# Patient Record
Sex: FEMALE | URBAN - METROPOLITAN AREA
[De-identification: names, ages, dates, MRNs, and addresses within clinical notes are randomized per-mention and may not be internally consistent; named-entity substitution may affect disease eponyms.]

---

## 2017-02-13 ENCOUNTER — IMPORTED ENCOUNTER (OUTPATIENT)
Dept: URBAN - METROPOLITAN AREA CLINIC 1 | Facility: CLINIC | Age: 57
End: 2017-02-13

## 2017-02-13 PROBLEM — Z79.899: Noted: 2017-02-13

## 2017-02-13 PROBLEM — H25.813: Noted: 2017-02-13

## 2017-02-13 PROBLEM — H04.123: Noted: 2017-02-13

## 2017-02-13 PROCEDURE — 92004 COMPRE OPH EXAM NEW PT 1/>: CPT

## 2017-02-13 NOTE — PATIENT DISCUSSION
1.  High Risk Medication-( rheumatoid arthritis) Hx of Plaquenil without evidence of Toxicity or Plaquenil retinopathy. 2.  Cataract OU: Observe for now without intervention. The patient was advised to contact us if any change or worsening of vision3. Dry Eyes OU -- Recommended to patient to use Artificial Tears BID OU. Gave patient a sample of Systane Balance4. Patient defers the refraction at today's visit    5. Return for an appointment in 1 year for 30. with Dr. Leonie Greco.

## 2017-03-15 NOTE — PATIENT DISCUSSION
OCULAR ALLERGIES OU: RX OVER THE COUNTER ALLERGIES DROP, SUCH AS ZADITOR TWICE A DAY IN BOTH EYES AS NEEDED.

## 2017-03-15 NOTE — PATIENT DISCUSSION
ANNALISE OU:  PRESCRIBED UV PROTECTION TO SLOW GROWTH. PRESCRIBE ARTIFICAL TEARS TO INCREASE COMFORT.

## 2018-01-31 ENCOUNTER — IMPORTED ENCOUNTER (OUTPATIENT)
Dept: URBAN - METROPOLITAN AREA CLINIC 1 | Facility: CLINIC | Age: 58
End: 2018-01-31

## 2018-01-31 PROBLEM — D48.5: Noted: 2018-01-31

## 2018-01-31 PROBLEM — INACTIVE: Noted: 2018-01-31

## 2018-01-31 PROBLEM — H25.813: Noted: 2018-01-31

## 2018-01-31 PROBLEM — H35.412: Noted: 2018-01-31

## 2018-01-31 PROBLEM — H04.123: Noted: 2018-01-31

## 2018-01-31 PROCEDURE — 92014 COMPRE OPH EXAM EST PT 1/>: CPT

## 2018-01-31 NOTE — PATIENT DISCUSSION
1.  RLL Mass of uncertain behavior- Plan excisional biopsy with pathology. Patient has noticed has grown in size within past three months. Patient notes bleeding when washing face sometimes has experienced irritation. 2.  Dry Eyes OU -- Recommended to patient to use Artificial Tears BID OU3. Cataract OU: Observe for now without intervention. The patient was advised to contact us if any change or worsening of vision4. 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 Patient was cautioned to call our office immediately if they experience a substantial change in their symptoms such as an increase in floaters persistent flashes loss of visual field (may appear as a shadow or a curtain) or decrease in visual acuity as these may indicate a retinal tear or detachment. Return for an appointment in excision and biopsy RLL with Dr. Sariah Dhillon. Return for an appointment in 1 year 27 with Dr. Catrachito Sandoval.

## 2018-02-14 ENCOUNTER — IMPORTED ENCOUNTER (OUTPATIENT)
Dept: URBAN - METROPOLITAN AREA CLINIC 1 | Facility: CLINIC | Age: 58
End: 2018-02-14

## 2018-02-14 PROCEDURE — 67840 REMOVE EYELID LESION: CPT

## 2018-02-14 NOTE — PATIENT DISCUSSION
Excision of lesion on right lower eyelid: After proper informed consent was obtained the patient was taken to the operating room and placed supine on the operating table. The right eye was then prepped in the standard surgical fashion. Attention was then turned to the right lower eyelid where one percent Xylocaine with Epinephrine was infiltrated under the lesion. A scalpel was then to make an elliptical incision around the lesion and Cristofer scissors were used to excise the mass free. Cautery was used for hemostasis and ointment was applied. The wound was small and therefore left to heal by secondary intention. The patient tolerated the procedure well and there were no complications.  Use Maxitrol marilyn BID to RLL x10 days then d/c. RTC 1 yr 30

## 2018-02-21 PROBLEM — D23.11: Noted: 2018-02-21

## 2019-11-20 NOTE — PATIENT DISCUSSION
Continue: Bepreve (bepotastine besilate): drops: 1.5% 1 drop twice a day as directed into both eyes 03-

## 2019-11-20 NOTE — PATIENT DISCUSSION
OCULAR ALLERGIES OU: RX BEPREVE ONE DROP TWICE A DAY AS NEED OR OVER THE COUNTER ALLERGIES DROP, SUCH AS ZADITOR TWICE A DAY IN BOTH EYES AS NEEDED.

## 2021-02-16 NOTE — PATIENT DISCUSSION
TRICHIASIS, OS LL: SOME LASHES INVERTED ON LOWER LID,  IF BECOMES MORE BOTHERSOME WILL CONSIDER REFERRAL FOR SURGICAL TREATMENT TO DR Amy Beckham

## 2022-02-15 NOTE — PROCEDURE NOTE: CLINICAL
PROCEDURE NOTE: Epilation Left Lower Lid. Diagnosis: Trichiasis without Entropion. Anesthesia: Topical. Prior to treatment, the risks/benefits/alternatives were discussed. The patient wished to proceed with procedure. Aberrant lashes removed from * lid(s) using microforcep. Patient tolerated procedure well. There were no complications. Post-op instructions given. Shruthi Disla

## 2022-04-02 ASSESSMENT — VISUAL ACUITY
OS_CC: 20/20
OS_CC: 20/20
OD_CC: 20/20
OD_CC: 20/20-1

## 2022-04-02 ASSESSMENT — TONOMETRY
OD_IOP_MMHG: 16
OD_IOP_MMHG: 17
OS_IOP_MMHG: 16
OS_IOP_MMHG: 17

## 2022-07-22 ENCOUNTER — NEW PATIENT (OUTPATIENT)
Dept: URBAN - METROPOLITAN AREA CLINIC 1 | Facility: CLINIC | Age: 62
End: 2022-07-22

## 2022-07-22 DIAGNOSIS — H04.123: ICD-10-CM

## 2022-07-22 DIAGNOSIS — H35.412: ICD-10-CM

## 2022-07-22 DIAGNOSIS — H25.813: ICD-10-CM

## 2022-07-22 PROCEDURE — 99203 OFFICE O/P NEW LOW 30 MIN: CPT

## 2022-07-22 PROCEDURE — 92015 DETERMINE REFRACTIVE STATE: CPT

## 2022-07-22 ASSESSMENT — KERATOMETRY
OS_AXISANGLE2_DEGREES: 175
OS_K1POWER_DIOPTERS: 44.25
OD_K1POWER_DIOPTERS: 43.75
OS_AXISANGLE_DEGREES: 085
OS_K2POWER_DIOPTERS: 44.50
OD_AXISANGLE_DEGREES: 170
OD_AXISANGLE2_DEGREES: 80
OD_K2POWER_DIOPTERS: 44.25

## 2022-07-22 ASSESSMENT — VISUAL ACUITY
OS_SC: 20/20
OS_BAT: 20/40
OD_SC: 20/40-2
OD_SC: J5
OD_BAT: 20/60
OS_SC: J2

## 2022-07-22 ASSESSMENT — TONOMETRY
OS_IOP_MMHG: 17
OD_IOP_MMHG: 17

## 2022-07-22 NOTE — PATIENT DISCUSSION
RD precautions. Patient cautioned to call our office immediately if they experience a substantial change in their symptoms such as an increase in floaters, persistent flashes, loss of visual field (may appear as a shadow or a curtain) or decrease in visual acuity as these may indicate a retinal tear or detachment.

## 2023-01-06 ENCOUNTER — HOSPITAL ENCOUNTER (EMERGENCY)
Age: 63
Discharge: HOME | End: 2023-01-06
Attending: EMERGENCY MEDICINE
Payer: MEDICARE

## 2023-01-06 VITALS
WEIGHT: 137 LBS | RESPIRATION RATE: 12 BRPM | OXYGEN SATURATION: 100 % | HEART RATE: 66 BPM | HEIGHT: 67 IN | BODY MASS INDEX: 21.5 KG/M2 | DIASTOLIC BLOOD PRESSURE: 88 MMHG | TEMPERATURE: 98.3 F | SYSTOLIC BLOOD PRESSURE: 142 MMHG

## 2023-01-06 DIAGNOSIS — R21 RASH: Primary | ICD-10-CM

## 2023-01-06 PROCEDURE — 99283 EMERGENCY DEPT VISIT LOW MDM: CPT

## 2023-01-06 RX ORDER — PERMETHRIN 50 MG/G
CREAM TOPICAL
Qty: 60 G | Refills: 0 | Status: SHIPPED | OUTPATIENT
Start: 2023-01-06 | End: 2023-02-05

## 2023-01-06 RX ORDER — HYDROCORTISONE 10 MG/ML
1 LOTION TOPICAL 2 TIMES DAILY
Qty: 60 G | Refills: 0 | Status: SHIPPED | OUTPATIENT
Start: 2023-01-06 | End: 2023-01-13

## 2023-01-06 NOTE — ED PROVIDER NOTES
[facility] EMERGENCY DEPT    Date: 1/6/2023  Patient Name: Ronak Ruggiero    History of Presenting Illness     Chief Complaint   Patient presents with    Rash     58 y.o. female with a past medical history as noted below presents the ED complaining of a rash for the past week. She notes having small dots to her left ankle, right breast, left neck and right face. She notes having diffuse pruritus. She does have pets in her house, states other family members do not have this. She denies any shortness of breath, throat swelling, fever, chills, drainage, body aches, other symptoms. Patient denies any other associated signs or symptoms. Patient denies any other complaints. Nursing notes regarding the HPI and triage nursing notes were reviewed. Prior medical records were reviewed. Current Outpatient Medications   Medication Sig Dispense Refill    permethrin (ACTICIN) 5 % topical cream Apply to body, leave overnight, rinse off in the AM. 60 g 0    hydrocortisone (ALA-DAVID) 1 % lotion Apply 1 Each to affected area two (2) times a day for 7 days. use thin layer 60 g 0       Past History     Past Medical History:  Past Medical History:   Diagnosis Date    Arthritis     Back pain     Hypothyroid     Osteoporosis        Past Surgical History:  No past surgical history on file. Family History:  No family history on file. Social History: Allergies: Allergies   Allergen Reactions    Levaquin [Levofloxacin] Nausea and Vomiting       Patient's primary care provider (as noted in EPIC):  Gwendolyn Saez NP    Review of Systems  Constitutional:  Denies malaise, fever, chills. ENMT: + minimal sore throat. Neck:  Denies injury or pain. Chest:  Denies injury. Cardiac:  Denies chest pain or palpitations. Respiratory:  Denies cough, wheezing, difficulty breathing, shortness of breath. GI/ABD:  Denies nausea, vomiting  :  Denies injury, pain, dysuria or urgency. Neuro:  Denies headache, LOC, dizziness, neurologic symptoms/deficits/paresthesias. Skin: + rash. All other systems negative as reviewed. Visit Vitals  BP (!) 142/88 (BP 1 Location: Left upper arm, BP Patient Position: Sitting)   Pulse 66   Temp 98.3 °F (36.8 °C)   Resp 12   Ht 5' 7\" (1.702 m)   Wt 62.1 kg (137 lb)   SpO2 100%   BMI 21.46 kg/m²       PHYSICAL EXAM:    CONSTITUTIONAL:  Alert, in no apparent distress;  well developed;  well nourished. HEAD:  Normocephalic, atraumatic. EYES:  EOMI. Non-icteric sclera. Normal conjunctiva. ENTM:  Nose:  no rhinorrhea. Throat:  no erythema or exudate, mucous membranes moist.  NECK: Supple  RESPIRATORY:  Chest clear, equal breath sounds, good air movement. Without wheezes, rhonchi or rales. CARDIOVASCULAR:  Regular rate and rhythm. No murmurs, rubs, or gallops. UPPER EXT:  Normal inspection. NEURO:  Moves all four extremities, and grossly normal motor exam.  SKIN:  No rashes;  Normal for age. PSYCH:  Alert and normal affect. SKIN: Left anterior medial ankle with few pink papules, pink patch noted to right cheek, left neck, right breast; without any surrounding erythema, edema, warmth. There is no fluctuance nor abscess. No cellulitis. IMPRESSION AND MEDICAL DECISION MAKING:  Patient noted to have pink papules to her left ankle, which appear somewhat scabietic. Also has a pink patch to her right cheek, left neck, right breast.  Will treat with permethrin, as we are unable to perform any skin scrapings here. Rx also for hydrocortisone 1% lotion, patient to follow-up with dermatology, return for any acute worsening. Diagnosis:   1.  Rash      Disposition: Discharge    Follow-up Information       Follow up With Specialties Details Why [facility]  Schedule an appointment as soon as possible for a visit   [address]  [address] [facility] [address]    [address] [facility] EMERGENCY DEPT Emergency Medicine  If symptoms worsen [address] [id number] [phone]            Patient's Medications   Start Taking    HYDROCORTISONE (ALA-DAVID) 1 % LOTION    Apply 1 Each to affected area two (2) times a day for 7 days.  use thin layer    PERMETHRIN (ACTICIN) 5 % TOPICAL CREAM    Apply to body, leave overnight, rinse off in the AM.   Continue Taking    No medications on file   These Medications have changed    No medications on file   Stop Taking    No medications on file     VIRGINIA Martinez

## 2023-01-06 NOTE — ED NOTES
I have reviewed discharge instructions with the patient. The patient verbalized understanding. Current Discharge Medication List        START taking these medications    Details   permethrin (ACTICIN) 5 % topical cream Apply to body, leave overnight, rinse off in the AM.  Qty: 60 g, Refills: 0  Start date: 1/6/2023, End date: 2/5/2023      hydrocortisone (ALA-DAVID) 1 % lotion Apply 1 Each to affected area two (2) times a day for 7 days.  use thin layer  Qty: 60 g, Refills: 0  Start date: 1/6/2023, End date: 1/13/2023